# Patient Record
Sex: MALE | Race: AMERICAN INDIAN OR ALASKA NATIVE | Employment: OTHER | URBAN - METROPOLITAN AREA
[De-identification: names, ages, dates, MRNs, and addresses within clinical notes are randomized per-mention and may not be internally consistent; named-entity substitution may affect disease eponyms.]

---

## 2017-04-18 PROBLEM — M50.321 OTHER CERVICAL DISC DEGENERATION AT C4-C5 LEVEL: Status: ACTIVE | Noted: 2017-04-18

## 2017-04-18 PROBLEM — G56.03 BILATERAL CARPAL TUNNEL SYNDROME: Status: ACTIVE | Noted: 2017-04-18

## 2017-05-03 ENCOUNTER — HOSPITAL ENCOUNTER (OUTPATIENT)
Age: 67
Setting detail: OUTPATIENT SURGERY
Discharge: HOME | End: 2017-05-03
Attending: NEUROLOGICAL SURGERY | Admitting: NEUROLOGICAL SURGERY
Payer: COMMERCIAL

## 2017-05-03 VITALS
TEMPERATURE: 99.1 F | WEIGHT: 216 LBS | OXYGEN SATURATION: 95 % | BODY MASS INDEX: 29.26 KG/M2 | HEIGHT: 72 IN | SYSTOLIC BLOOD PRESSURE: 140 MMHG | DIASTOLIC BLOOD PRESSURE: 82 MMHG | RESPIRATION RATE: 16 BRPM | HEART RATE: 69 BPM

## 2017-05-03 PROCEDURE — 6370000000 HC RX 637 (ALT 250 FOR IP): Performed by: NEUROLOGICAL SURGERY

## 2017-05-03 PROCEDURE — 2500000003 HC RX 250 WO HCPCS: Performed by: NEUROLOGICAL SURGERY

## 2017-05-03 PROCEDURE — 3600000003 HC SURGERY LEVEL 3 BASE: Performed by: NEUROLOGICAL SURGERY

## 2017-05-03 PROCEDURE — 3600000013 HC SURGERY LEVEL 3 ADDTL 15MIN: Performed by: NEUROLOGICAL SURGERY

## 2017-05-03 PROCEDURE — 2580000003 HC RX 258: Performed by: NEUROLOGICAL SURGERY

## 2017-05-03 RX ORDER — HYDROCODONE BITARTRATE AND ACETAMINOPHEN 5; 325 MG/1; MG/1
1 TABLET ORAL EVERY 6 HOURS PRN
Qty: 28 TABLET | Refills: 0 | Status: SHIPPED | OUTPATIENT
Start: 2017-05-03 | End: 2017-05-10

## 2017-05-03 RX ORDER — BUPIVACAINE HYDROCHLORIDE AND EPINEPHRINE 2.5; 5 MG/ML; UG/ML
INJECTION, SOLUTION EPIDURAL; INFILTRATION; INTRACAUDAL; PERINEURAL PRN
Status: DISCONTINUED | OUTPATIENT
Start: 2017-05-03 | End: 2017-05-04 | Stop reason: HOSPADM

## 2017-05-03 RX ORDER — GINSENG 100 MG
CAPSULE ORAL PRN
Status: DISCONTINUED | OUTPATIENT
Start: 2017-05-03 | End: 2017-05-04 | Stop reason: HOSPADM

## 2017-05-03 RX ORDER — MAGNESIUM HYDROXIDE 1200 MG/15ML
LIQUID ORAL CONTINUOUS PRN
Status: DISCONTINUED | OUTPATIENT
Start: 2017-05-03 | End: 2017-05-04 | Stop reason: HOSPADM

## 2017-05-03 ASSESSMENT — PAIN DESCRIPTION - DESCRIPTORS: DESCRIPTORS: ACHING

## 2017-05-03 ASSESSMENT — PAIN - FUNCTIONAL ASSESSMENT: PAIN_FUNCTIONAL_ASSESSMENT: 0-10

## 2017-06-21 ENCOUNTER — HOSPITAL ENCOUNTER (OUTPATIENT)
Age: 67
Setting detail: OUTPATIENT SURGERY
Discharge: HOME | End: 2017-06-21
Attending: NEUROLOGICAL SURGERY | Admitting: NEUROLOGICAL SURGERY
Payer: COMMERCIAL

## 2017-06-21 VITALS
SYSTOLIC BLOOD PRESSURE: 138 MMHG | RESPIRATION RATE: 20 BRPM | WEIGHT: 204 LBS | HEIGHT: 72 IN | OXYGEN SATURATION: 99 % | BODY MASS INDEX: 27.63 KG/M2 | DIASTOLIC BLOOD PRESSURE: 74 MMHG | TEMPERATURE: 99 F | HEART RATE: 86 BPM

## 2017-06-21 PROCEDURE — 3600000003 HC SURGERY LEVEL 3 BASE: Performed by: NEUROLOGICAL SURGERY

## 2017-06-21 PROCEDURE — 2500000003 HC RX 250 WO HCPCS: Performed by: NEUROLOGICAL SURGERY

## 2017-06-21 PROCEDURE — 2580000003 HC RX 258: Performed by: NEUROLOGICAL SURGERY

## 2017-06-21 PROCEDURE — 6370000000 HC RX 637 (ALT 250 FOR IP): Performed by: NEUROLOGICAL SURGERY

## 2017-06-21 PROCEDURE — 3600000013 HC SURGERY LEVEL 3 ADDTL 15MIN: Performed by: NEUROLOGICAL SURGERY

## 2017-06-21 PROCEDURE — 7100000010 HC PHASE II RECOVERY - FIRST 15 MIN: Performed by: NEUROLOGICAL SURGERY

## 2017-06-21 RX ORDER — MAGNESIUM HYDROXIDE 1200 MG/15ML
LIQUID ORAL CONTINUOUS PRN
Status: DISCONTINUED | OUTPATIENT
Start: 2017-06-21 | End: 2017-06-21 | Stop reason: HOSPADM

## 2017-06-21 RX ORDER — GINSENG 100 MG
CAPSULE ORAL PRN
Status: DISCONTINUED | OUTPATIENT
Start: 2017-06-21 | End: 2017-06-21 | Stop reason: HOSPADM

## 2017-06-21 RX ORDER — BUPIVACAINE HYDROCHLORIDE AND EPINEPHRINE 2.5; 5 MG/ML; UG/ML
INJECTION, SOLUTION EPIDURAL; INFILTRATION; INTRACAUDAL; PERINEURAL PRN
Status: DISCONTINUED | OUTPATIENT
Start: 2017-06-21 | End: 2017-06-21 | Stop reason: HOSPADM

## 2017-06-21 RX ORDER — OXYCODONE HYDROCHLORIDE AND ACETAMINOPHEN 5; 325 MG/1; MG/1
1 TABLET ORAL EVERY 6 HOURS PRN
Qty: 40 TABLET | Refills: 0 | Status: SHIPPED | OUTPATIENT
Start: 2017-06-21 | End: 2017-07-01

## 2017-06-21 ASSESSMENT — PAIN - FUNCTIONAL ASSESSMENT: PAIN_FUNCTIONAL_ASSESSMENT: 0-10

## 2018-02-21 PROBLEM — M48.061 SPINAL STENOSIS, LUMBAR REGION, WITHOUT NEUROGENIC CLAUDICATION: Status: ACTIVE | Noted: 2018-02-21

## 2019-05-07 PROBLEM — M06.09 RHEUMATOID ARTHRITIS OF MULTIPLE SITES WITHOUT RHEUMATOID FACTOR (HCC): Status: ACTIVE | Noted: 2019-05-07

## 2019-05-08 ENCOUNTER — HOSPITAL ENCOUNTER (OUTPATIENT)
Dept: INFUSION THERAPY | Age: 69
Setting detail: INFUSION SERIES
Discharge: HOME | End: 2019-05-08
Payer: MEDICARE

## 2019-05-08 VITALS
HEART RATE: 72 BPM | TEMPERATURE: 97.4 F | SYSTOLIC BLOOD PRESSURE: 142 MMHG | RESPIRATION RATE: 18 BRPM | WEIGHT: 199.96 LBS | BODY MASS INDEX: 27.12 KG/M2 | DIASTOLIC BLOOD PRESSURE: 81 MMHG

## 2019-05-08 DIAGNOSIS — M50.321 OTHER CERVICAL DISC DEGENERATION AT C4-C5 LEVEL: ICD-10-CM

## 2019-05-08 DIAGNOSIS — M06.09 RHEUMATOID ARTHRITIS OF MULTIPLE SITES WITHOUT RHEUMATOID FACTOR (HCC): Primary | ICD-10-CM

## 2019-05-08 DIAGNOSIS — M48.061 SPINAL STENOSIS, LUMBAR REGION, WITHOUT NEUROGENIC CLAUDICATION: ICD-10-CM

## 2019-05-08 PROCEDURE — 6360000002 HC RX W HCPCS: Performed by: INTERNAL MEDICINE

## 2019-05-08 PROCEDURE — 96413 CHEMO IV INFUSION 1 HR: CPT

## 2019-05-08 PROCEDURE — 2580000003 HC RX 258: Performed by: INTERNAL MEDICINE

## 2019-05-08 RX ORDER — SODIUM CHLORIDE 0.9 % (FLUSH) 0.9 %
10 SYRINGE (ML) INJECTION PRN
Status: CANCELLED | OUTPATIENT
Start: 2019-06-05

## 2019-05-08 RX ORDER — SODIUM CHLORIDE 0.9 % (FLUSH) 0.9 %
10 SYRINGE (ML) INJECTION PRN
Status: DISCONTINUED | OUTPATIENT
Start: 2019-05-08 | End: 2019-05-09 | Stop reason: HOSPADM

## 2019-05-08 RX ADMIN — GOLIMUMAB 181.25 MG: 50 SOLUTION INTRAVENOUS at 10:43

## 2019-05-08 RX ADMIN — Medication 10 ML: at 11:16

## 2019-05-08 NOTE — PROGRESS NOTES
Pt arrived to unit walking  Has swollen joints from arthritis  Settled into recliner and explained procedure  IV started

## 2019-06-05 ENCOUNTER — HOSPITAL ENCOUNTER (OUTPATIENT)
Dept: INFUSION THERAPY | Age: 69
Setting detail: INFUSION SERIES
Discharge: HOME | End: 2019-06-05
Payer: MEDICARE

## 2019-06-05 VITALS
BODY MASS INDEX: 26.99 KG/M2 | TEMPERATURE: 98.6 F | SYSTOLIC BLOOD PRESSURE: 157 MMHG | WEIGHT: 199 LBS | DIASTOLIC BLOOD PRESSURE: 81 MMHG | HEART RATE: 88 BPM | RESPIRATION RATE: 18 BRPM

## 2019-06-05 DIAGNOSIS — M50.321 OTHER CERVICAL DISC DEGENERATION AT C4-C5 LEVEL: ICD-10-CM

## 2019-06-05 DIAGNOSIS — M48.061 SPINAL STENOSIS, LUMBAR REGION, WITHOUT NEUROGENIC CLAUDICATION: ICD-10-CM

## 2019-06-05 DIAGNOSIS — M06.09 RHEUMATOID ARTHRITIS OF MULTIPLE SITES WITHOUT RHEUMATOID FACTOR (HCC): Primary | ICD-10-CM

## 2019-06-05 PROCEDURE — 2580000003 HC RX 258: Performed by: INTERNAL MEDICINE

## 2019-06-05 PROCEDURE — 96413 CHEMO IV INFUSION 1 HR: CPT

## 2019-06-05 PROCEDURE — 6360000002 HC RX W HCPCS: Performed by: INTERNAL MEDICINE

## 2019-06-05 RX ORDER — DULOXETIN HYDROCHLORIDE 60 MG/1
60 CAPSULE, DELAYED RELEASE ORAL DAILY
COMMUNITY
End: 2019-12-20

## 2019-06-05 RX ORDER — PREDNISONE 1 MG/1
5 TABLET ORAL DAILY
COMMUNITY
End: 2019-12-20

## 2019-06-05 RX ORDER — SODIUM CHLORIDE 0.9 % (FLUSH) 0.9 %
10 SYRINGE (ML) INJECTION PRN
Status: DISCONTINUED | OUTPATIENT
Start: 2019-06-05 | End: 2019-06-06 | Stop reason: HOSPADM

## 2019-06-05 RX ORDER — SODIUM CHLORIDE 0.9 % (FLUSH) 0.9 %
10 SYRINGE (ML) INJECTION PRN
Status: CANCELLED | OUTPATIENT
Start: 2019-07-31

## 2019-06-05 RX ORDER — OXYCODONE HYDROCHLORIDE AND ACETAMINOPHEN 5; 325 MG/1; MG/1
1 TABLET ORAL EVERY 8 HOURS PRN
COMMUNITY
End: 2019-12-20

## 2019-06-05 RX ADMIN — Medication 10 ML: at 09:50

## 2019-06-05 RX ADMIN — Medication 10 ML: at 09:19

## 2019-06-05 RX ADMIN — GOLIMUMAB 180 MG: 50 SOLUTION INTRAVENOUS at 09:19

## 2019-06-05 NOTE — PROGRESS NOTES
Pt to [facility] for tariq John. Tolerated infusion. Questions about med discussed. Pt states c/o or requests. All equipment used in the care for this patient has been cleaned.

## 2019-06-10 ENCOUNTER — HOSPITAL ENCOUNTER (OUTPATIENT)
Dept: ULTRASOUND IMAGING | Age: 69
Discharge: HOME | End: 2019-06-12
Payer: MEDICARE

## 2019-06-10 DIAGNOSIS — M79.89 LEG SWELLING: ICD-10-CM

## 2019-06-10 PROCEDURE — 93971 EXTREMITY STUDY: CPT

## 2019-06-19 ENCOUNTER — HOSPITAL ENCOUNTER (OUTPATIENT)
Dept: NON INVASIVE DIAGNOSTICS | Age: 69
Discharge: HOME | End: 2019-06-19
Payer: MEDICARE

## 2019-06-19 ENCOUNTER — HOSPITAL ENCOUNTER (OUTPATIENT)
Dept: ULTRASOUND IMAGING | Age: 69
Discharge: HOME | End: 2019-06-21
Payer: MEDICARE

## 2019-06-19 DIAGNOSIS — R60.0 EDEMA EXTREMITIES: ICD-10-CM

## 2019-06-19 DIAGNOSIS — E46 PROTEIN DEFICIENCY (HCC): ICD-10-CM

## 2019-06-19 LAB
LV EF: 50 %
LVEF MODALITY: NORMAL

## 2019-06-19 PROCEDURE — 76700 US EXAM ABDOM COMPLETE: CPT

## 2019-06-19 PROCEDURE — 93306 TTE W/DOPPLER COMPLETE: CPT

## 2019-07-15 ENCOUNTER — HOSPITAL ENCOUNTER (OUTPATIENT)
Dept: GENERAL RADIOLOGY | Age: 69
Discharge: HOME | End: 2019-07-17
Payer: MEDICARE

## 2019-07-15 ENCOUNTER — HOSPITAL ENCOUNTER (OUTPATIENT)
Dept: NUCLEAR MEDICINE | Age: 69
Discharge: HOME | End: 2019-07-17
Payer: MEDICARE

## 2019-07-15 ENCOUNTER — HOSPITAL ENCOUNTER (OUTPATIENT)
Dept: CT IMAGING | Age: 69
Discharge: HOME | End: 2019-07-17
Payer: MEDICARE

## 2019-07-15 ENCOUNTER — HOSPITAL ENCOUNTER (OUTPATIENT)
Dept: GENERAL RADIOLOGY | Age: 69
End: 2019-07-15
Payer: MEDICARE

## 2019-07-15 VITALS — WEIGHT: 200 LBS | HEIGHT: 72 IN | BODY MASS INDEX: 27.09 KG/M2

## 2019-07-15 DIAGNOSIS — E87.6 HYPOKALEMIA: ICD-10-CM

## 2019-07-15 DIAGNOSIS — R93.89 ABNORMAL CT SCAN: ICD-10-CM

## 2019-07-15 DIAGNOSIS — N28.89 KIDNEY MASS: ICD-10-CM

## 2019-07-15 DIAGNOSIS — M89.8X9 BONE PAIN: ICD-10-CM

## 2019-07-15 DIAGNOSIS — R05.9 COUGH: ICD-10-CM

## 2019-07-15 DIAGNOSIS — M48.061 SPINAL STENOSIS OF LUMBAR REGION, UNSPECIFIED WHETHER NEUROGENIC CLAUDICATION PRESENT: ICD-10-CM

## 2019-07-15 PROCEDURE — 74177 CT ABD & PELVIS W/CONTRAST: CPT

## 2019-07-15 PROCEDURE — 2580000003 HC RX 258: Performed by: FAMILY MEDICINE

## 2019-07-15 PROCEDURE — 3430000000 HC RX DIAGNOSTIC RADIOPHARMACEUTICAL: Performed by: FAMILY MEDICINE

## 2019-07-15 PROCEDURE — A9503 TC99M MEDRONATE: HCPCS | Performed by: FAMILY MEDICINE

## 2019-07-15 PROCEDURE — 6360000004 HC RX CONTRAST MEDICATION: Performed by: FAMILY MEDICINE

## 2019-07-15 PROCEDURE — 78306 BONE IMAGING WHOLE BODY: CPT

## 2019-07-15 PROCEDURE — 71046 X-RAY EXAM CHEST 2 VIEWS: CPT

## 2019-07-15 PROCEDURE — 2500000003 HC RX 250 WO HCPCS: Performed by: FAMILY MEDICINE

## 2019-07-15 RX ORDER — SODIUM CHLORIDE 0.9 % (FLUSH) 0.9 %
10 SYRINGE (ML) INJECTION PRN
Status: DISCONTINUED | OUTPATIENT
Start: 2019-07-15 | End: 2019-07-18 | Stop reason: HOSPADM

## 2019-07-15 RX ORDER — TC 99M MEDRONATE 20 MG/10ML
25 INJECTION, POWDER, LYOPHILIZED, FOR SOLUTION INTRAVENOUS
Status: COMPLETED | OUTPATIENT
Start: 2019-07-15 | End: 2019-07-15

## 2019-07-15 RX ORDER — SODIUM CHLORIDE 0.9 % (FLUSH) 0.9 %
10 SYRINGE (ML) INJECTION ONCE
Status: DISCONTINUED | OUTPATIENT
Start: 2019-07-15 | End: 2019-07-18 | Stop reason: HOSPADM

## 2019-07-15 RX ADMIN — IOPAMIDOL 100 ML: 612 INJECTION, SOLUTION INTRAVENOUS at 11:51

## 2019-07-15 RX ADMIN — TC 99M MEDRONATE 26.8 MILLICURIE: 20 INJECTION, POWDER, LYOPHILIZED, FOR SOLUTION INTRAVENOUS at 10:25

## 2019-07-15 RX ADMIN — Medication 10 ML: at 10:25

## 2019-07-15 RX ADMIN — BARIUM SULFATE 450 ML: 20 SUSPENSION ORAL at 10:30

## 2019-07-31 ENCOUNTER — HOSPITAL ENCOUNTER (OUTPATIENT)
Dept: INFUSION THERAPY | Age: 69
Setting detail: INFUSION SERIES
End: 2019-07-31
Payer: MEDICARE

## 2019-08-02 ENCOUNTER — HOSPITAL ENCOUNTER (OUTPATIENT)
Dept: INFUSION THERAPY | Age: 69
Setting detail: INFUSION SERIES
Discharge: HOME | End: 2019-08-02
Payer: MEDICARE

## 2019-08-02 VITALS
TEMPERATURE: 97.9 F | WEIGHT: 199.96 LBS | BODY MASS INDEX: 27.12 KG/M2 | HEART RATE: 76 BPM | SYSTOLIC BLOOD PRESSURE: 146 MMHG | DIASTOLIC BLOOD PRESSURE: 80 MMHG | RESPIRATION RATE: 18 BRPM

## 2019-08-02 DIAGNOSIS — M48.061 SPINAL STENOSIS, LUMBAR REGION, WITHOUT NEUROGENIC CLAUDICATION: ICD-10-CM

## 2019-08-02 DIAGNOSIS — M50.321 OTHER CERVICAL DISC DEGENERATION AT C4-C5 LEVEL: ICD-10-CM

## 2019-08-02 DIAGNOSIS — M06.09 RHEUMATOID ARTHRITIS OF MULTIPLE SITES WITHOUT RHEUMATOID FACTOR (HCC): Primary | ICD-10-CM

## 2019-08-02 PROCEDURE — 6360000002 HC RX W HCPCS: Performed by: INTERNAL MEDICINE

## 2019-08-02 PROCEDURE — 96413 CHEMO IV INFUSION 1 HR: CPT

## 2019-08-02 PROCEDURE — 2580000003 HC RX 258: Performed by: INTERNAL MEDICINE

## 2019-08-02 RX ORDER — SODIUM CHLORIDE 0.9 % (FLUSH) 0.9 %
10 SYRINGE (ML) INJECTION PRN
Status: DISCONTINUED | OUTPATIENT
Start: 2019-08-02 | End: 2019-08-03 | Stop reason: HOSPADM

## 2019-08-02 RX ORDER — SODIUM CHLORIDE 0.9 % (FLUSH) 0.9 %
10 SYRINGE (ML) INJECTION PRN
Status: CANCELLED | OUTPATIENT
Start: 2019-09-27

## 2019-08-02 RX ADMIN — GOLIMUMAB 181.25 MG: 50 SOLUTION INTRAVENOUS at 09:27

## 2019-08-02 ASSESSMENT — PAIN DESCRIPTION - LOCATION: LOCATION: HAND;NECK;LEG

## 2019-08-02 ASSESSMENT — PAIN SCALES - GENERAL: PAINLEVEL_OUTOF10: 10

## 2019-08-02 ASSESSMENT — PAIN DESCRIPTION - PAIN TYPE: TYPE: CHRONIC PAIN

## 2019-08-02 ASSESSMENT — PAIN DESCRIPTION - DESCRIPTORS: DESCRIPTORS: NUMBNESS;STABBING

## 2019-08-02 NOTE — PROGRESS NOTES
Pt arrived to unit walking  Complaints of severe pain in hands and legs due to arthritis  Settled into recliner and IV started  States he was in recently with cellulitis and that Dr said he could get treatment

## 2019-12-20 RX ORDER — FENTANYL 50 UG/H
1 PATCH TRANSDERMAL
COMMUNITY
End: 2020-02-04 | Stop reason: SDUPTHER

## 2019-12-20 RX ORDER — DEXAMETHASONE 6 MG/1
6 TABLET ORAL EVERY 8 HOURS PRN
COMMUNITY

## 2019-12-20 RX ORDER — LORAZEPAM 1 MG/1
1 TABLET ORAL
COMMUNITY

## 2019-12-20 RX ORDER — ONDANSETRON 4 MG/1
4 TABLET, FILM COATED ORAL EVERY 4 HOURS PRN
COMMUNITY

## 2019-12-20 RX ORDER — GABAPENTIN 400 MG/1
400 CAPSULE ORAL 3 TIMES DAILY
COMMUNITY

## 2019-12-20 RX ORDER — POLYETHYLENE GLYCOL 3350 17 G/17G
17 POWDER, FOR SOLUTION ORAL EVERY 12 HOURS PRN
COMMUNITY

## 2019-12-20 RX ORDER — ACETAMINOPHEN 325 MG/1
650 TABLET ORAL EVERY 4 HOURS PRN
COMMUNITY

## 2019-12-20 RX ORDER — SENNA PLUS 8.6 MG/1
2 TABLET ORAL EVERY 12 HOURS PRN
COMMUNITY

## 2019-12-20 RX ORDER — MAGNESIUM HYDROXIDE/ALUMINUM HYDROXICE/SIMETHICONE 120; 1200; 1200 MG/30ML; MG/30ML; MG/30ML
5 SUSPENSION ORAL EVERY 4 HOURS PRN
COMMUNITY

## 2019-12-20 RX ORDER — HYDROMORPHONE HYDROCHLORIDE 8 MG/1
8 TABLET ORAL
COMMUNITY
End: 2020-02-11 | Stop reason: SDUPTHER

## 2019-12-21 ENCOUNTER — OFFICE VISIT (OUTPATIENT)
Dept: GERIATRIC MEDICINE | Age: 69
End: 2019-12-21
Payer: MEDICARE

## 2019-12-21 PROCEDURE — G8484 FLU IMMUNIZE NO ADMIN: HCPCS | Performed by: FAMILY MEDICINE

## 2019-12-21 PROCEDURE — 99306 1ST NF CARE HIGH MDM 50: CPT | Performed by: FAMILY MEDICINE

## 2019-12-21 PROCEDURE — 1123F ACP DISCUSS/DSCN MKR DOCD: CPT | Performed by: FAMILY MEDICINE

## 2020-01-24 ENCOUNTER — OFFICE VISIT (OUTPATIENT)
Dept: GERIATRIC MEDICINE | Age: 70
End: 2020-01-24
Payer: MEDICARE

## 2020-01-24 PROCEDURE — 99309 SBSQ NF CARE MODERATE MDM 30: CPT | Performed by: NURSE PRACTITIONER

## 2020-01-24 PROCEDURE — 1123F ACP DISCUSS/DSCN MKR DOCD: CPT | Performed by: NURSE PRACTITIONER

## 2020-01-24 PROCEDURE — G8484 FLU IMMUNIZE NO ADMIN: HCPCS | Performed by: NURSE PRACTITIONER

## 2020-01-27 LAB
BASOPHILS ABSOLUTE: 0 /ΜL
BASOPHILS RELATIVE PERCENT: 0 %
BUN BLDV-MCNC: 20 MG/DL
CALCIUM SERPL-MCNC: 8.6 MG/DL
CHLORIDE BLD-SCNC: 96 MMOL/L
CO2: 29 MMOL/L
CREAT SERPL-MCNC: 0.76 MG/DL
EOSINOPHILS ABSOLUTE: 0 /ΜL
EOSINOPHILS RELATIVE PERCENT: 0 %
GFR CALCULATED: 60
GLUCOSE BLD-MCNC: 110 MG/DL
HCT VFR BLD CALC: 36.4 % (ref 41–53)
HEMOGLOBIN: 11.4 G/DL (ref 13.5–17.5)
LYMPHOCYTES ABSOLUTE: 3.61 /ΜL
LYMPHOCYTES RELATIVE PERCENT: 21 %
MCH RBC QN AUTO: 32.7 PG
MCHC RBC AUTO-ENTMCNC: 31.3 G/DL
MCV RBC AUTO: 104.3 FL
MONOCYTES ABSOLUTE: 0.52 /ΜL
MONOCYTES RELATIVE PERCENT: 3 %
NEUTROPHILS ABSOLUTE: 12.36 /ΜL
NEUTROPHILS RELATIVE PERCENT: 72 %
PLATELET # BLD: 414 K/ΜL
PMV BLD AUTO: 9.3 FL
POTASSIUM SERPL-SCNC: 4.5 MMOL/L
RBC # BLD: 3.49 10^6/ΜL
SODIUM BLD-SCNC: 140 MMOL/L
WBC # BLD: 17.17 10^3/ML

## 2020-01-31 RX ORDER — FENTANYL 100 UG/H
1 PATCH TRANSDERMAL
COMMUNITY
End: 2020-02-04 | Stop reason: SDUPTHER

## 2020-02-04 RX ORDER — FENTANYL 100 UG/H
1 PATCH TRANSDERMAL
Qty: 4 PATCH | Refills: 0 | Status: SHIPPED | OUTPATIENT
Start: 2020-02-04 | End: 2020-02-05 | Stop reason: SDUPTHER

## 2020-02-04 RX ORDER — FENTANYL 50 UG/H
1 PATCH TRANSDERMAL
Qty: 4 PATCH | Refills: 0 | Status: SHIPPED | OUTPATIENT
Start: 2020-02-04 | End: 2020-02-05 | Stop reason: SDUPTHER

## 2020-02-05 RX ORDER — FENTANYL 50 UG/H
1 PATCH TRANSDERMAL
Qty: 4 PATCH | Refills: 0 | Status: SHIPPED | OUTPATIENT
Start: 2020-02-05 | End: 2020-02-19

## 2020-02-05 RX ORDER — FENTANYL 100 UG/H
1 PATCH TRANSDERMAL
Qty: 4 PATCH | Refills: 0 | Status: SHIPPED | OUTPATIENT
Start: 2020-02-05 | End: 2020-02-19

## 2020-02-09 VITALS
TEMPERATURE: 98.1 F | BODY MASS INDEX: 25.79 KG/M2 | RESPIRATION RATE: 18 BRPM | WEIGHT: 184.9 LBS | DIASTOLIC BLOOD PRESSURE: 73 MMHG | OXYGEN SATURATION: 92 % | SYSTOLIC BLOOD PRESSURE: 125 MMHG | HEART RATE: 67 BPM

## 2020-02-09 PROBLEM — K13.79 MOUTH PAIN: Status: ACTIVE | Noted: 2020-02-09

## 2020-02-09 PROBLEM — I11.9 HYPERTENSIVE HEART DISEASE: Status: ACTIVE | Noted: 2020-02-09

## 2020-02-09 PROBLEM — G89.29 OTHER CHRONIC PAIN: Status: ACTIVE | Noted: 2020-02-09

## 2020-02-09 PROBLEM — T07.XXXA MULTIPLE WOUNDS: Status: ACTIVE | Noted: 2020-02-09

## 2020-02-09 PROBLEM — C90.00 MULTIPLE MYELOMA (HCC): Status: ACTIVE | Noted: 2020-02-09

## 2020-02-09 PROBLEM — F41.9 ANXIETY: Status: ACTIVE | Noted: 2020-02-09

## 2020-02-09 NOTE — PROGRESS NOTES
[address] [address]  Chief Complaint   Patient presents with    Multiple Myeloma    Pain     chronic    Anxiety    Other     multiple wounds    Other     hypertensive heart disease       The patient is being seen today for multiple myeloma, multiple wounds, chronic pain, hypertensive heart disease, and anxiety. SUBJECTIVE: Resident was admitted to this facility from [facility] [place]. He did arrive on hospice services. Resident has since that time advised nursing staff he no longer wants to be on hospice and he wants to participate in therapy. Hospice services were rescinded and resident is now participating in therapy. FAMILY AND SOCIAL HISTORY: Refer to H&P. Past Medical History:   Diagnosis Date    Depression        MEDICATIONS AND ALLERGIES: Reviewed on chart at nursing facility. REVIEW OF SYSTEMS: Resident denies any headache, dizziness, blurred vision. He does complain of mouth pain. He denies any chest pain, shortness of breath, abdominal pain, nausea, vomiting, or changes in his bowel or bladder habits. He states he is sleeping okay, but he is not eating well because of the pain in his mouth, and he is requesting something for that. PHYSICAL EXAMINATION: Resident's most recent vital signs, blood pressure 125/73, temp 98.1, heart rate 67, respirations 18, pulse oxing 92% on room air, weight 184.9. CONSTITUTIONAL: Resident is alert and oriented x3, elderly male, in no apparent distress, pleasant and cooperative with exam. INTEGUMENT: Pink, warm, dry. He does have ACE wraps to his bilateral lower extremities. HEENT: Resident is noted to have a lesion on the lateral aspect of the right side of his tongue, which he states has been present for some time. It does have a pink base to it. However, it appears to be eroding the side of his tongue. Normocephalic, atraumatic. Hard of hearing. Conjunctivae pink. Sclerae nonicteric. Mucous membranes pink, moist. No oropharyngeal exudate.  NECK:

## 2020-02-11 RX ORDER — HYDROMORPHONE HYDROCHLORIDE 8 MG/1
8 TABLET ORAL
Qty: 90 TABLET | Refills: 0 | Status: SHIPPED | OUTPATIENT
Start: 2020-02-11 | End: 2020-02-25

## 2020-02-13 ENCOUNTER — OFFICE VISIT (OUTPATIENT)
Dept: GERIATRIC MEDICINE | Age: 70
End: 2020-02-13
Payer: MEDICARE

## 2020-02-13 PROCEDURE — G8484 FLU IMMUNIZE NO ADMIN: HCPCS | Performed by: NURSE PRACTITIONER

## 2020-02-13 PROCEDURE — 99309 SBSQ NF CARE MODERATE MDM 30: CPT | Performed by: NURSE PRACTITIONER

## 2020-02-13 PROCEDURE — 1123F ACP DISCUSS/DSCN MKR DOCD: CPT | Performed by: NURSE PRACTITIONER

## 2020-02-28 VITALS
BODY MASS INDEX: 25.1 KG/M2 | DIASTOLIC BLOOD PRESSURE: 81 MMHG | OXYGEN SATURATION: 97 % | RESPIRATION RATE: 18 BRPM | WEIGHT: 180 LBS | TEMPERATURE: 97.1 F | SYSTOLIC BLOOD PRESSURE: 139 MMHG | HEART RATE: 72 BPM

## 2020-02-28 PROBLEM — R07.81 RIB PAIN ON RIGHT SIDE: Status: ACTIVE | Noted: 2020-02-28

## 2020-02-28 NOTE — PROGRESS NOTES
PATIENT: Marianne Nunez : 1950 DOS: 2020     [facility]  Chief Complaint   Patient presents with    Rib Pain       SUBJECTIVE: The patient is being seen today for acute visit for right rib pain. Resident is scheduled to start chemo next week per nursing staff. He is complaining of right rib pain and shortness of breath today, increasing with inspiration. Otherwise, no concerns. FAMILY AND SOCIAL HISTORY: Refer to H&P. Past Medical History:   Diagnosis Date    Depression        MEDICATIONS AND ALLERGIES: Reviewed on chart at nursing facility. REVIEW OF SYSTEMS: Resident denies any headache, dizziness, blurred vision. He is complaining of right rib pain and increased shortness of breath today. He states the pain increases with inspiration. He denies any abdominal pain, nausea, vomiting, or changes in his bowel or bladder habits. PHYSICAL EXAMINATION: Most recent vital signs, /81, temp 97.1, heart rate 72, respirations 18, pulse oxing 97% on room air, weight 180. CONSTITUTIONAL: Resident is alert, elderly male, in no apparent distress, pleasant and cooperative with exam. INTEGUMENT: Pink, warm, and dry. He does have scattered ecchymotic areas to his bilateral upper extremities. HEENT: Normocephalic, atraumatic. Conjunctivae pink. Sclerae nonicteric. Mucous membranes pink, moist. No oropharyngeal exudate. NECK: Supple. No cervical or clavicular lymphadenopathy. CV: Regular rate and rhythm. No murmurs, gallops, rubs. RESPIRATORY: Lung sounds with expiratory wheezes on the right, diminished throughout on the right. Abdomen: Soft, nontender, nondistended. Normoactive bowel sounds. PV: Peripheral pulses present. No edema noted. ASSESSMENT AND PLAN:  1. Right rib pain. We will get a chest x-ray, AP and lateral STAT and a CBC with diff STAT. I reviewed this resident's medications and treatment plan as well as most recent lab work. No further changes will be made at this time.

## 2020-02-29 VITALS
SYSTOLIC BLOOD PRESSURE: 133 MMHG | HEART RATE: 90 BPM | OXYGEN SATURATION: 90 % | RESPIRATION RATE: 20 BRPM | TEMPERATURE: 99.5 F | DIASTOLIC BLOOD PRESSURE: 81 MMHG

## 2020-03-01 NOTE — PROGRESS NOTES
wound on his sacrum as related to me by the nurse taking care of him; hence at this time no measurements were obtained so as not to increase the discomfort that the patient already has. The patient is alert and able to answer just few questions, but seems that he prefers to sleep and after sometime he will answer questions and fall back as if he is sleeping again. ASSESSMENT AND PLAN:  1.   Multiple myeloma with pathologic fracture: The patient has been enrolled in hospice program with [facility] [address] and so we will as much as possible provide him with the comfort care as necessary. 2.   Chronic Musculoskeletal Pain, generalized: The patient currently takes Dilaudid 8 mg tablet q.8 hours p.r.n. as needed for pain in addition to Duragesic patch 150 mcg per hour placed q.8 hours q.3 days. The most recent one was placed on 12/18/2019. Goal is to provide the patient with adequate analgesia. 3.   Sacral pressure injury, stage 4:  According to the nurse, who admitted and the one taking care of him, today she reports that she was able to visualize the underlying skeletal bone and she graded the wound as stage IV and they will continue to dress it according to the direction of cleaning the wound with normal saline, pat dry, apply calcium alginate with dry, sterile dressing that is to be done on the daily basis. 4.   Fever, unspecified:  Cause could be due to multiple factors including the wound on his sacral area and other things going on with the patient. For now, he will receive acetaminophen 650 mg as directed.         Electronically Signed By: Ragini Huntley MD on 02/29/2020 14:22:46  ______________________________  Ragini Huntley MD  /[id number]  D: 12/22/2019 19:27:55  T: 12/23/2019 18:42:11    cc: - [id number]

## 2023-09-07 VITALS
DIASTOLIC BLOOD PRESSURE: 90 MMHG | RESPIRATION RATE: 16 BRPM | HEART RATE: 85 BPM | WEIGHT: 180.78 LBS | SYSTOLIC BLOOD PRESSURE: 150 MMHG | HEIGHT: 70 IN | BODY MASS INDEX: 25.88 KG/M2 | OXYGEN SATURATION: 92 % | TEMPERATURE: 99 F

## (undated) DEVICE — STOCKINETTE: Brand: DEROYAL

## (undated) DEVICE — SUTURE ETHLN SZ 5-0 L18IN NONABSORBABLE BLK L13MM P-3 3/8 698H

## (undated) DEVICE — GOWN,AURORA,NONREINFORCED,LARGE: Brand: MEDLINE

## (undated) DEVICE — 1842 FOAM BLOCK NEEDLE COUNTER: Brand: DEVON

## (undated) DEVICE — PACK,SET UP,DRAPE: Brand: MEDLINE

## (undated) DEVICE — SUTURE VCRL + SZ 4-0 L18IN ABSRB UD L19MM PS-2 3/8 CIR PRIM VCP496H

## (undated) DEVICE — GAUZE,SPONGE,2"X2",8PLY,STERILE,LF,2'S: Brand: MEDLINE

## (undated) DEVICE — INTENDED FOR TISSUE SEPARATION, AND OTHER PROCEDURES THAT REQUIRE A SHARP SURGICAL BLADE TO PUNCTURE OR CUT.: Brand: BARD-PARKER ® CARBON RIB-BACK BLADES

## (undated) DEVICE — SKIN MARKER,REGULAR TIP WITH RULER: Brand: DEVON

## (undated) DEVICE — X-RAY DETECTABLE SPONGES,16 PLY: Brand: VISTEC

## (undated) DEVICE — DRAPE SURGICAL HAND PROX AURORA

## (undated) DEVICE — CORD BPLR 2 PIN FLAT AND RND DISP

## (undated) DEVICE — NEEDLE HYPO 25GA L0625IN THN WALL PIVOTING SHLD BVL ORIENTED

## (undated) DEVICE — CHLORAPREP 26ML ORANGE

## (undated) DEVICE — BANDAGE,GAUZE,CONFORMING,3"X75",STRL,LF: Brand: MEDLINE

## (undated) DEVICE — DISCONTINUED USE 393278 SYRINGE 10 ML HYPO W/O NDL LL TP PLSTC ST

## (undated) DEVICE — PAD N ADH W3XL4IN POLY COT SFT PERF FLM EASILY CUT ABSRB

## (undated) DEVICE — GLOVE SURG 7 LTX TRIFLEX WIDE FINGER PWDR